# Patient Record
Sex: MALE | Race: WHITE | Employment: OTHER | URBAN - METROPOLITAN AREA
[De-identification: names, ages, dates, MRNs, and addresses within clinical notes are randomized per-mention and may not be internally consistent; named-entity substitution may affect disease eponyms.]

---

## 2018-11-29 ENCOUNTER — HOSPITAL ENCOUNTER (EMERGENCY)
Age: 68
Discharge: ANOTHER HOSPITAL | End: 2018-11-29
Attending: EMERGENCY MEDICINE
Payer: MEDICARE

## 2018-11-29 ENCOUNTER — APPOINTMENT (OUTPATIENT)
Dept: GENERAL RADIOLOGY | Age: 68
End: 2018-11-29
Attending: EMERGENCY MEDICINE
Payer: MEDICARE

## 2018-11-29 VITALS
RESPIRATION RATE: 32 BRPM | HEART RATE: 80 BPM | WEIGHT: 205 LBS | OXYGEN SATURATION: 97 % | HEIGHT: 71 IN | TEMPERATURE: 98.3 F | SYSTOLIC BLOOD PRESSURE: 152 MMHG | BODY MASS INDEX: 28.7 KG/M2 | DIASTOLIC BLOOD PRESSURE: 81 MMHG

## 2018-11-29 DIAGNOSIS — S66.902A OPEN WOUND OF LEFT WRIST WITH TENDON INVOLVEMENT, INITIAL ENCOUNTER: ICD-10-CM

## 2018-11-29 DIAGNOSIS — S61.502A OPEN WOUND OF LEFT WRIST WITH TENDON INVOLVEMENT, INITIAL ENCOUNTER: ICD-10-CM

## 2018-11-29 DIAGNOSIS — S55.012A LACERATION OF LEFT ULNAR ARTERY, INITIAL ENCOUNTER: Primary | ICD-10-CM

## 2018-11-29 LAB
ABO + RH BLD: NORMAL
ANION GAP SERPL CALC-SCNC: 6 MMOL/L (ref 3–18)
BASOPHILS # BLD: 0 K/UL (ref 0–0.1)
BASOPHILS NFR BLD: 0 % (ref 0–2)
BLOOD GROUP ANTIBODIES SERPL: NORMAL
BUN SERPL-MCNC: 17 MG/DL (ref 7–18)
BUN/CREAT SERPL: 17 (ref 12–20)
CALCIUM SERPL-MCNC: 7.5 MG/DL (ref 8.5–10.1)
CHLORIDE SERPL-SCNC: 110 MMOL/L (ref 100–108)
CO2 SERPL-SCNC: 27 MMOL/L (ref 21–32)
CREAT SERPL-MCNC: 1.03 MG/DL (ref 0.6–1.3)
DIFFERENTIAL METHOD BLD: ABNORMAL
EOSINOPHIL # BLD: 0.3 K/UL (ref 0–0.4)
EOSINOPHIL NFR BLD: 4 % (ref 0–5)
ERYTHROCYTE [DISTWIDTH] IN BLOOD BY AUTOMATED COUNT: 13 % (ref 11.6–14.5)
GLUCOSE SERPL-MCNC: 134 MG/DL (ref 74–99)
HCT VFR BLD AUTO: 38.5 % (ref 36–48)
HGB BLD-MCNC: 12.9 G/DL (ref 13–16)
INR PPP: 1.1 (ref 0.8–1.2)
LYMPHOCYTES # BLD: 1.6 K/UL (ref 0.9–3.6)
LYMPHOCYTES NFR BLD: 22 % (ref 21–52)
MCH RBC QN AUTO: 28.4 PG (ref 24–34)
MCHC RBC AUTO-ENTMCNC: 33.5 G/DL (ref 31–37)
MCV RBC AUTO: 84.6 FL (ref 74–97)
MONOCYTES # BLD: 0.6 K/UL (ref 0.05–1.2)
MONOCYTES NFR BLD: 9 % (ref 3–10)
NEUTS SEG # BLD: 4.9 K/UL (ref 1.8–8)
NEUTS SEG NFR BLD: 65 % (ref 40–73)
PLATELET # BLD AUTO: 208 K/UL (ref 135–420)
PMV BLD AUTO: 9.8 FL (ref 9.2–11.8)
POTASSIUM SERPL-SCNC: 4.2 MMOL/L (ref 3.5–5.5)
PROTHROMBIN TIME: 14.1 SEC (ref 11.5–15.2)
RBC # BLD AUTO: 4.55 M/UL (ref 4.7–5.5)
SODIUM SERPL-SCNC: 143 MMOL/L (ref 136–145)
SPECIMEN EXP DATE BLD: NORMAL
WBC # BLD AUTO: 7.4 K/UL (ref 4.6–13.2)

## 2018-11-29 PROCEDURE — 86901 BLOOD TYPING SEROLOGIC RH(D): CPT

## 2018-11-29 PROCEDURE — 73110 X-RAY EXAM OF WRIST: CPT

## 2018-11-29 PROCEDURE — 96374 THER/PROPH/DIAG INJ IV PUSH: CPT

## 2018-11-29 PROCEDURE — 96361 HYDRATE IV INFUSION ADD-ON: CPT

## 2018-11-29 PROCEDURE — 96375 TX/PRO/DX INJ NEW DRUG ADDON: CPT

## 2018-11-29 PROCEDURE — 99285 EMERGENCY DEPT VISIT HI MDM: CPT

## 2018-11-29 PROCEDURE — 96376 TX/PRO/DX INJ SAME DRUG ADON: CPT

## 2018-11-29 PROCEDURE — 85025 COMPLETE CBC W/AUTO DIFF WBC: CPT

## 2018-11-29 PROCEDURE — 80048 BASIC METABOLIC PNL TOTAL CA: CPT

## 2018-11-29 PROCEDURE — 74011250636 HC RX REV CODE- 250/636: Performed by: EMERGENCY MEDICINE

## 2018-11-29 PROCEDURE — 85610 PROTHROMBIN TIME: CPT

## 2018-11-29 RX ORDER — FENTANYL CITRATE 50 UG/ML
100 INJECTION, SOLUTION INTRAMUSCULAR; INTRAVENOUS ONCE
Status: COMPLETED | OUTPATIENT
Start: 2018-11-29 | End: 2018-11-29

## 2018-11-29 RX ORDER — CEFAZOLIN SODIUM 1 G/3ML
1 INJECTION, POWDER, FOR SOLUTION INTRAMUSCULAR; INTRAVENOUS
Status: COMPLETED | OUTPATIENT
Start: 2018-11-29 | End: 2018-11-29

## 2018-11-29 RX ORDER — ONDANSETRON 2 MG/ML
4 INJECTION INTRAMUSCULAR; INTRAVENOUS
Status: COMPLETED | OUTPATIENT
Start: 2018-11-29 | End: 2018-11-29

## 2018-11-29 RX ADMIN — FENTANYL CITRATE 100 MCG: 50 INJECTION INTRAMUSCULAR; INTRAVENOUS at 09:08

## 2018-11-29 RX ADMIN — CEFAZOLIN 1 G: 1 INJECTION, POWDER, FOR SOLUTION INTRAMUSCULAR; INTRAVENOUS at 10:25

## 2018-11-29 RX ADMIN — SODIUM CHLORIDE 1000 ML: 900 INJECTION, SOLUTION INTRAVENOUS at 09:00

## 2018-11-29 RX ADMIN — FENTANYL CITRATE 100 MCG: 50 INJECTION INTRAMUSCULAR; INTRAVENOUS at 10:30

## 2018-11-29 RX ADMIN — ONDANSETRON 4 MG: 2 INJECTION INTRAMUSCULAR; INTRAVENOUS at 10:25

## 2018-11-29 NOTE — ED NOTES
Complete dressing change. Tourniquet removed for 2 minutes and then reapplied. Direct pressure to wound during removal. Patient tolerated well.

## 2018-11-29 NOTE — CONSULTS
Surgery Consult      Patient: Shaila Booth MRN: [id number]  CSN: [id number]      YOB: 1950    Age: 79 y.o. Sex: male      DOA: 11/29/2018       HPI:     Shaila Booth is a 79 y.o. male who was attempting to help a friend with getting a boat ready to sail down [address]. He was removing plastic ties with a  and had difficulty with one and slipped through into his left wrist.  EMS called immediately and placed tourniquet by report at [address] [facility] [employer] tourniquet replaced at forearm level by ED physician. 2192 vascular is notified patient in ED bleeding. I was at bedside at [phone]. Currently patient is unable to move or feel hand. No past medical history on file. No past surgical history on file. No family history on file. Social History     Socioeconomic History    Marital status:      Spouse name: Not on file    Number of children: Not on file    Years of education: Not on file    Highest education level: Not on file       Prior to Admission medications    Not on File       No Known Allergies    Physical Exam:      Visit Vitals  /88 (BP 1 Location: Right arm, BP Patient Position: At rest)   Pulse 82   Temp 98.3 °F (36.8 °C)   Resp 26   Ht 5' 11\" (1.803 m)   Wt 205 lb (93 kg)   SpO2 98%   BMI 28.59 kg/m²       GENERAL: alert, cooperative, mild distress, appears older than stated age, EYE: negative findings: anicteric sclera and EOMI, THROAT & NECK: normal and no erythema or exudates noted. , LUNG: clear to auscultation bilaterally, HEART: regular rate and rhythm, S1, S2 normal, no murmur, click, rub or gallop, ABDOMEN: soft, non-tender. Bowel sounds normal. No masses,  no organomegaly, EXTREMITIES:  Left hand is purple and swollen along with purple forearm.   Removed dressing and small laceration over medial forearm approximately 3inches proximal to wrist,  Unable to move hand currently in clenched position cannot feel anything, NEUROLOGIC: negative findings: alert, oriented x3 cranial nerves II-XII intact Although heard of hearing    ROS:  Constitutional: negative  Eyes: negative  Ears, nose, mouth, throat, and face: negative  Respiratory: negative  Cardiovascular: negative  Gastrointestinal: negative  Genitourinary:negative  Hematologic/lymphatic: negative  Musculoskeletal:negative  Neurological: negative  Behavioral/Psych: negative  Endocrine: negative  Allergic/Immunologic: negative  Unless otherwise mentioned in the HPI. Data Review:    CBC:   Lab Results   Component Value Date/Time    WBC 7.4 11/29/2018 09:35 AM    RBC 4.55 (L) 11/29/2018 09:35 AM    HGB 12.9 (L) 11/29/2018 09:35 AM    HCT 38.5 11/29/2018 09:35 AM    PLATELET 013 90/11/4057 09:35 AM      BMP:   Lab Results   Component Value Date/Time    Glucose 134 (H) 11/29/2018 09:35 AM    Sodium 143 11/29/2018 09:35 AM    Potassium 4.2 11/29/2018 09:35 AM    Chloride 110 (H) 11/29/2018 09:35 AM    CO2 27 11/29/2018 09:35 AM    BUN 17 11/29/2018 09:35 AM    Creatinine 1.03 11/29/2018 09:35 AM    Calcium 7.5 (L) 11/29/2018 09:35 AM     Coagulation:   Lab Results   Component Value Date/Time    Prothrombin time 14.1 11/29/2018 09:35 AM    INR 1.1 11/29/2018 09:35 AM         Assessment/Plan     78 y/o male with laceration to ulnar artery. --Once removed tourniquet able to unclench hand but has not feeling in ulnar distribution. Able to get full color resolution to hand indicative of good palmar arch and patent radial artery. --Ortho states no hand availability currently so decision made to send patient to [facility].  --reapplied tourniquet with instructions to release every few minutes with manual pressure applied to stick site and replace to allow for occasional blood flow to hand. Active Problems:    * No active hospital problems.  Claritza Enciso MD  November 29, 2018

## 2018-11-29 NOTE — ED PROVIDER NOTES
EMERGENCY DEPARTMENT HISTORY AND PHYSICAL EXAM 
 
9:06 AM 
 
 
Date: 11/29/2018 Patient Name: Pili Albright History of Presenting Illness Chief Complaint Patient presents with  Laceration History Provided By: Patient Chief Complaint: Laceration Duration:  PTA Timing:  Acute Location: L palm/wrist 
Quality: Not reported Severity: Severe Modifying Factors: Worsens with finger movement Associated Symptoms: Nausea, light-headedness Additional History (Context): Pili Albright is a 79 y.o. male with a PMHx of a bundle branch block and HTN who presents with acute, severe laceration to his L wrist onset PTA. Patient reports he was on a dock, cutting a power cord out of its wrapper when he accidentally cut the inside of his L wrist with a circular saw. He reports that blood immediately gushed out, especially when moving his fingers. Associated symptoms include nausea, light-headedness, and numbness of his L hand. EMS applied a tourniquet to his upper L arm and gave fluids PTA. Patient takes daily hypertension medication, baby aspirin, and fish oil. He denies smoking or drugs and admits to occasional EtOH use. 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 No other symptoms or concerns were expressed. Pt is R handed. Last tetanus 3 years ago. PCP: Other, MD Deloris  
 
 
Past History Past Medical History: No past medical history on file. Past Surgical History: No past surgical history on file. Family History: No family history on file. Social History: 
Social History Tobacco Use  Smoking status: Not on file Substance Use Topics  Alcohol use: Not on file  Drug use: Not on file Allergies: 
No Known Allergies Review of Systems Review of Systems Gastrointestinal: Positive for nausea. Skin: Positive for color change (pallor, L hand) and wound (laceration to L palm/wrist area). Neurological: Positive for light-headedness. All other systems reviewed and are negative. Physical Exam  
 
Visit Vitals /81 Pulse 80 Temp 98.3 °F (36.8 °C) Resp (!) 32 Ht 5' 11\" (1.803 m) Wt 93 kg (205 lb) SpO2 97% BMI 28.59 kg/m² Physical Exam  
Constitutional: He is oriented to person, place, and time. He appears well-developed and well-nourished. No distress. HENT:  
Head: Normocephalic and atraumatic. Mouth/Throat: Oropharynx is clear and moist.  
Eyes: Conjunctivae are normal. No scleral icterus. Neck: Normal range of motion. Neck supple. Cardiovascular: Normal rate and intact distal pulses. Pulmonary/Chest: Effort normal and breath sounds normal. No respiratory distress. He has no wheezes. Abdominal: Soft. Bowel sounds are normal. He exhibits no distension. There is no tenderness. Musculoskeletal: Normal range of motion. He exhibits no edema. LUE tourniquet in place Lymphadenopathy:  
  He has no cervical adenopathy. Neurological: He is alert and oriented to person, place, and time. A sensory deficit (No sensation in digits 4-5. Decreased sensation in digits 1-3.) is present. No cranial nerve deficit. Digits held in flexion, patient reports unable to extend. Skin: Skin is warm and dry. Laceration (2 cm laceration to palmar surface of L wrist) noted. He is not diaphoretic. Hand is pale, cap refill 5 sec Nursing note and vitals reviewed. Diagnostic Study Results Labs - Recent Results (from the past 12 hour(s)) CBC WITH AUTOMATED DIFF Collection Time: 11/29/18  9:35 AM  
Result Value Ref Range WBC 7.4 4.6 - 13.2 K/uL  
 RBC 4.55 (L) 4.70 - 5.50 M/uL  
 HGB 12.9 (L) 13.0 - 16.0 g/dL HCT 38.5 36.0 - 48.0 % MCV 84.6 74.0 - 97.0 FL  
 MCH 28.4 24.0 - 34.0 PG  
 MCHC 33.5 31.0 - 37.0 g/dL  
 RDW 13.0 11.6 - 14.5 % PLATELET 210 831 - 705 K/uL MPV 9.8 9.2 - 11.8 FL  
 NEUTROPHILS 65 40 - 73 % LYMPHOCYTES 22 21 - 52 % MONOCYTES 9 3 - 10 % EOSINOPHILS 4 0 - 5 % BASOPHILS 0 0 - 2 %  
 ABS. NEUTROPHILS 4.9 1.8 - 8.0 K/UL  
 ABS. LYMPHOCYTES 1.6 0.9 - 3.6 K/UL  
 ABS. MONOCYTES 0.6 0.05 - 1.2 K/UL  
 ABS. EOSINOPHILS 0.3 0.0 - 0.4 K/UL  
 ABS. BASOPHILS 0.0 0.0 - 0.1 K/UL  
 DF AUTOMATED METABOLIC PANEL, BASIC Collection Time: 11/29/18  9:35 AM  
Result Value Ref Range Sodium 143 136 - 145 mmol/L Potassium 4.2 3.5 - 5.5 mmol/L Chloride 110 (H) 100 - 108 mmol/L  
 CO2 27 21 - 32 mmol/L Anion gap 6 3.0 - 18 mmol/L Glucose 134 (H) 74 - 99 mg/dL BUN 17 7.0 - 18 MG/DL Creatinine 1.03 0.6 - 1.3 MG/DL  
 BUN/Creatinine ratio 17 12 - 20 GFR est AA >60 >60 ml/min/1.73m2 GFR est non-AA >60 >60 ml/min/1.73m2 Calcium 7.5 (L) 8.5 - 10.1 MG/DL PROTHROMBIN TIME + INR Collection Time: 11/29/18  9:35 AM  
Result Value Ref Range Prothrombin time 14.1 11.5 - 15.2 sec INR 1.1 0.8 - 1.2 TYPE & SCREEN Collection Time: 11/29/18  9:35 AM  
Result Value Ref Range Crossmatch Expiration 12/02/2018 ABO/Rh(D) A NEGATIVE Antibody screen NEG Radiologic Studies -  
XR WRIST LT AP/LAT/OBL MIN 3V Final Result IMPRESSION: 
1. Radial sided soft tissue defect. 
  
2. No evidence of fracture dislocation. 
  
3.  Multi joint osteoarthritis. XR WRIST LT AP/LAT/OBL MIN 3V: (Interpretation by ED Physician): 
No acute process. Medical Decision Making I am the first provider for this patient. I reviewed the vital signs, available nursing notes, past medical history, past surgical history, family history and social history. Vital Signs-Reviewed the patient's vital signs. Pulse Oximetry Analysis -  100% on room air, WNL Cardiac Monitor: 
Rate: 82 bpm 
Rhythm:  Normal Sinus Rhythm Records Reviewed: Nursing Notes (Time of Review: 9:06 AM) 
 
ED Course: Progress Notes, Reevaluation, and Consults: 
 
925 AM. Discussed with Dr. Anthony Slater, orthopedics regarding possible tendon injury. Requests vascular consult and will be available for consultation as needed. 9:29 AM. Elier Dewey taking off tourniquet. Significant bleeding restarted so the tourniquet was reapplied. Consult:  Discussed care with Dr. Shefali Valdez (Vascular Surgery). Standard discussion; including history of patients chief complaint, available diagnostic results, and treatment course. Currently in the OR, but will come to evaluate patient in the next 5 minutes. 9:56 AM, 11/29/2018  
 
10:09 AM: 
Dr. Pam Brooks and Dr. Shefali Valdez at bedside for evaluation of patient. Consult:  Discussed care with Dr. Megan Lacey (ED Physician, [facility]). Standard discussion; including history of patients chief complaint, available diagnostic results, and treatment course. Accepts patient for transfer. 10:56 AM, 11/29/2018 Provider Notes (Medical Decision Making):  
 
80 y/o male presents with wrist laceration, possible arterial injury and tendon injury. Will xr toe lizzette for fx, consult ortho, vascular. Critical Care Time: The services I provided to this patient were to treat and/or prevent clinically significant deterioration that could result in the failure of one or more body systems and/or organ systems due to hemorrhage. Services included the following: 
-reviewing nursing notes and old charts 
-vital sign assessments 
-direct patient care 
-medication orders and management 
-interpreting and reviewing diagnostic studies/labs 
-re-evaluations 
-documentation time Aggregate critical care time was 32 minutes, which includes only time during which I was engaged in work directly related to the patient's care as described above, whether I was at bedside or elsewhere in the Emergency Department. It did not include time spent performing other reported procedures or the services of residents, students, nurses, or advance practice providers. Rosana Simpson DO 
11:16 AM 
 
 
Diagnosis Clinical Impression: 1. Laceration of left ulnar artery, initial encounter 2. Open wound of left wrist with tendon involvement, initial encounter Disposition: Transferred to [facility] acting as a scribe for and in the presence of Ange Adams DO November 29, 2018 at 9:27 AM 
    
Provider Attestation:     
I personally performed the services described in the documentation, reviewed the documentation, as recorded by the scribe in my presence, and it accurately and completely records my words and actions. November 29, 2018 at 9:27 AM - Ange Adams DO   
_______________________________

## 2018-11-29 NOTE — ED TRIAGE NOTES
From work via EMS with laceration to left posterior wrist.  Distal hand is cold, pale and without movement or sensation. EMS applied tourniquet to left upper arm at 0838. Last oral intake at 0745 this am.  Solid and liquid

## 2018-11-29 NOTE — ED NOTES
Tourniquet loosened bleeding began immediately. Color returned to hand. Tourniquet tightened until bleeding stopped.

## 2018-11-29 NOTE — ED NOTES
Report called to [facility] ED. [facility] transport present. And report. Patient prepared for transfer